# Patient Record
Sex: MALE | Race: WHITE | HISPANIC OR LATINO | Employment: FULL TIME | URBAN - METROPOLITAN AREA
[De-identification: names, ages, dates, MRNs, and addresses within clinical notes are randomized per-mention and may not be internally consistent; named-entity substitution may affect disease eponyms.]

---
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
## 2021-05-19 ENCOUNTER — APPOINTMENT (OUTPATIENT)
Dept: RADIOLOGY | Facility: MEDICAL CENTER | Age: 21
End: 2021-05-19
Attending: EMERGENCY MEDICINE
Payer: COMMERCIAL

## 2021-05-19 ENCOUNTER — HOSPITAL ENCOUNTER (EMERGENCY)
Facility: MEDICAL CENTER | Age: 21
End: 2021-05-19
Attending: EMERGENCY MEDICINE
Payer: COMMERCIAL

## 2021-05-19 VITALS
SYSTOLIC BLOOD PRESSURE: 112 MMHG | HEART RATE: 82 BPM | RESPIRATION RATE: 16 BRPM | HEIGHT: 68 IN | BODY MASS INDEX: 33.34 KG/M2 | OXYGEN SATURATION: 94 % | WEIGHT: 220 LBS | DIASTOLIC BLOOD PRESSURE: 55 MMHG | TEMPERATURE: 97.4 F

## 2021-05-19 DIAGNOSIS — S82.001A CLOSED DISPLACED FRACTURE OF RIGHT PATELLA, UNSPECIFIED FRACTURE MORPHOLOGY, INITIAL ENCOUNTER: ICD-10-CM

## 2021-05-19 DIAGNOSIS — S83.004A PATELLAR DISLOCATION, RIGHT, INITIAL ENCOUNTER: ICD-10-CM

## 2021-05-19 PROCEDURE — 99285 EMERGENCY DEPT VISIT HI MDM: CPT

## 2021-05-19 PROCEDURE — 700102 HCHG RX REV CODE 250 W/ 637 OVERRIDE(OP): Performed by: EMERGENCY MEDICINE

## 2021-05-19 PROCEDURE — 73562 X-RAY EXAM OF KNEE 3: CPT | Mod: RT

## 2021-05-19 PROCEDURE — A9270 NON-COVERED ITEM OR SERVICE: HCPCS | Performed by: EMERGENCY MEDICINE

## 2021-05-19 PROCEDURE — 27560 TREAT KNEECAP DISLOCATION: CPT

## 2021-05-19 PROCEDURE — 96374 THER/PROPH/DIAG INJ IV PUSH: CPT

## 2021-05-19 PROCEDURE — 700111 HCHG RX REV CODE 636 W/ 250 OVERRIDE (IP): Performed by: EMERGENCY MEDICINE

## 2021-05-19 RX ORDER — ACETAMINOPHEN 325 MG/1
650 TABLET ORAL ONCE
Status: COMPLETED | OUTPATIENT
Start: 2021-05-19 | End: 2021-05-19

## 2021-05-19 RX ORDER — OXYCODONE HYDROCHLORIDE 5 MG/1
5 TABLET ORAL EVERY 6 HOURS PRN
Qty: 10 TABLET | Refills: 0 | Status: SHIPPED | OUTPATIENT
Start: 2021-05-19 | End: 2021-05-22

## 2021-05-19 RX ORDER — IBUPROFEN 600 MG/1
600 TABLET ORAL ONCE
Status: COMPLETED | OUTPATIENT
Start: 2021-05-19 | End: 2021-05-19

## 2021-05-19 RX ORDER — HYDROCODONE BITARTRATE AND ACETAMINOPHEN 5; 325 MG/1; MG/1
1 TABLET ORAL ONCE
Status: COMPLETED | OUTPATIENT
Start: 2021-05-19 | End: 2021-05-19

## 2021-05-19 RX ADMIN — ACETAMINOPHEN 650 MG: 325 TABLET, FILM COATED ORAL at 20:08

## 2021-05-19 RX ADMIN — HYDROCODONE BITARTRATE AND ACETAMINOPHEN 1 TABLET: 5; 325 TABLET ORAL at 20:08

## 2021-05-19 RX ADMIN — IBUPROFEN 600 MG: 600 TABLET ORAL at 20:08

## 2021-05-19 RX ADMIN — FENTANYL CITRATE 50 MCG: 50 INJECTION, SOLUTION INTRAMUSCULAR; INTRAVENOUS at 18:27

## 2021-05-19 ASSESSMENT — FIBROSIS 4 INDEX: FIB4 SCORE: 0.36

## 2021-05-19 ASSESSMENT — PAIN DESCRIPTION - PAIN TYPE: TYPE: ACUTE PAIN

## 2021-05-19 NOTE — LETTER
"  FORM C-4:  EMPLOYEE’S CLAIM FOR COMPENSATION/ REPORT OF INITIAL TREATMENT  EMPLOYEE’S CLAIM - PROVIDE ALL INFORMATION REQUESTED   First Name HALEIGH Last Name Ronnie Birthdate 2000  Sex male Claim Number   Home [id number]   [place]             Zip [address]                                   Age  21 y.o. Height  1.727 m (5' 8\") Weight  99.8 kg (220 lb) N  [id number]   Mailing Address [id number]  [place]              Zip [address] Telephone  [phone] (home)  Primary Language Spoken   Insurer   Third Party   AIG Employee's Occupation (Job Title) When Injury or Occupational Disease Occurred  [employer]   Employer's Name  Telephone [phone]    Employer Address [address] [place] [facility] [place] [address]] Zip [address]   Date of Injury  5/19/2021       Hour of Injury  4:00 PM Date Employer Notified  5/19/2021 Last Day of Work after Injury or Occupational Disease  5/19/2021 Supervisor to Whom Injury Reported  [employer]   Address or Location of Accident (if applicable) Work [1]   What were you doing at the time of accident? (if applicable) Walking towards the stairs    How did this injury or occupational disease occur? Be specific and answer in detail. Use additional sheet if necessary)  Walking towards the stairs, turned and knee popped. Dropped to ground immediately. 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   If you believe that you have an occupational disease, when did you first have knowledge of the disability and it relationship to your employment? NA Witnesses to the Accident  None- NA   Nature of Injury or Occupational Disease  Workers' Compensation Part(s) of Body Injured or Affected  Knee (R), Lower Leg (R), N/A    I CERTIFY THAT THE ABOVE IS TRUE AND CORRECT TO THE BEST OF MY KNOWLEDGE AND THAT I HAVE PROVIDED THIS INFORMATION IN ORDER TO OBTAIN THE BENEFITS OF [place] AND " [place]  I HEREBY AUTHORIZE ANY PHYSICIAN, CHIROPRACTOR, SURGEON, PRACTITIONER, OR OTHER PERSON, ANY HOSPITAL, INCLUDING [facility] OR [facility], ANY MEDICAL SERVICE ORGANIZATION, ANY INSURANCE COMPANY, OR OTHER INSTITUTION OR ORGANIZATION TO RELEASE TO EACH OTHER, ANY MEDICAL OR OTHER INFORMATION, INCLUDING BENEFITS PAID OR PAYABLE, PERTINENT TO THIS INJURY OR DISEASE, EXCEPT INFORMATION RELATIVE TO DIAGNOSIS, TREATMENT AND/OR COUNSELING FOR AIDS, PSYCHOLOGICAL CONDITIONS, ALCOHOL OR CONTROLLED SUBSTANCES, FOR WHICH I MUST GIVE SPECIFIC AUTHORIZATION.  A PHOTOSTAT OF THIS AUTHORIZATION SHALL BE AS VALID AS THE ORIGINAL.  Date  05/19/2021                                    Place [facility]                                          Employee’s Signature   THIS REPORT MUST BE COMPLETED AND MAILED WITHIN 3 WORKING DAYS OF TREATMENT   Place [facility], EMERGENCY DEPT                       Name of Facility [facility]   Date  5/19/2021 Diagnosis  (S83.004A) Patellar dislocation, right, initial encounter  (S82.001A) Closed displaced fracture of right patella, unspecified fracture morphology, initial encounter Is there evidence the injured employee was under the influence of alcohol and/or another controlled substance at the time of accident?   Hour  8:35 PM Description of Injury or Disease  Patellar dislocation, right, initial encounter  Closed displaced fracture of right patella, unspecified fracture morphology, initial encounter No   Treatment  Dislocation reduction, knee immobilizer  Have you advised the patient to remain off work five days or more?         Yes   X-Ray Findings  Positive If Yes   From Date  5/19/2021 To Date  5/26/2021   From information given by the employee, together with medical evidence, can you directly connect this injury or occupational disease as job incurred? Yes If No,  "is employee capable of: Full Duty  No Modified Duty  No   Is additional medical care by a physician indicated? Yes  Comments:close ortho follow up and surgery If Modified Duty, Specify any Limitations / Restrictions       Do you know of any previous injury or disease contributing to this condition or occupational disease? Yes  Comments:prior dislocation, but no history of fracture    Date 5/19/2021 Print Doctor’s Name Zuly Morales I certify the employer’s copy of this form was mailed on:   Address [address] [place] [address] [id number] [phone] INSURER’S USE ONLY   Provider’s Tax ID Number   Telephone Dept: [phone]    Doctor’s Signature kelvin-ZULY Montero M.D. Degree  MD      Form C-4 (rev.10/07)                                                                         BRIEF DESCRIPTION OF RIGHTS AND BENEFITS  (Pursuant to [place] 616[place])    Notice of Injury or Occupational Disease (Incident Report Form C-1): If an injury or occupational disease (OD) arises out of and in the course of employment, you must provide written notice to your employer as soon as practicable, but no later than 7 days after the accident or OD. Your employer shall maintain a sufficient supply of the required forms.    Claim for Compensation (Form C-4): If medical treatment is sought, the form C-4 is available at the place of initial treatment. A completed \"Claim for Compensation\" (Form C-4) must be filed within 90 days after an accident or OD. The treating physician or chiropractor must, within 3 working days after treatment, complete and mail to the employer, the employer's insurer and third-party , the Claim for Compensation.    Medical Treatment: If you require medical treatment for your on-the-job injury or OD, you may be required to select a physician or chiropractor from a list provided by your workers’ compensation insurer, if it has contracted with an Organization for Managed Care (MCO) or Preferred Provider " Organization (PPO) or providers of health care. If your employer has not entered into a contract with an MCO or PPO, you may select a physician or chiropractor from the Panel of Physicians and Chiropractors. Any medical costs related to your industrial injury or OD will be paid by your insurer.    Temporary Total Disability (TTD): If your doctor has certified that you are unable to work for a period of at least 5 consecutive days, or 5 cumulative days in a 20-day period, or places restrictions on you that your employer does not accommodate, you may be entitled to TTD compensation.    Temporary Partial Disability (TPD): If the wage you receive upon reemployment is less than the compensation for TTD to which you are entitled, the insurer may be required to pay you TPD compensation to make up the difference. TPD can only be paid for a maximum of 24 months.    Permanent Partial Disability (PPD): When your medical condition is stable and there is an indication of a PPD as a result of your injury or OD, within 30 days, your insurer must arrange for an evaluation by a rating physician or chiropractor to determine the degree of your PPD. The amount of your PPD award depends on the date of injury, the results of the PPD evaluation, your age and wage.    Permanent Total Disability (PTD): If you are medically certified by a treating physician or chiropractor as permanently and totally disabled and have been granted a PTD status by your insurer, you are entitled to receive monthly benefits not to exceed 66 2/3% of your average monthly wage. The amount of your PTD payments is subject to reduction if you previously received a lump-sum PPD award.    Vocational Rehabilitation Services: You may be eligible for vocational rehabilitation services if you are unable to return to the job due to a permanent physical impairment or permanent restrictions as a result of your injury or occupational disease.    Transportation and Per [place]  Reimbursement: You may be eligible for travel expenses and per ugo associated with medical treatment.    Reopening: You may be able to reopen your claim if your condition worsens after claim closure.     Appeal Process: If you disagree with a written determination issued by the insurer or the insurer does not respond to your request, you may appeal to the Department of Administration, , by following the instructions contained in your determination letter. You must appeal the determination within 70 days from the date of the determination letter at [address], [address], [place], [place] [address], or [address], [place], [place] [address]. If you disagree with the  decision, you may appeal to the Department of Administration, . You must file your appeal within 30 days from the date of the  decision letter at [address], [address], [place], [place] [address], or [address], [place], [place] [address]. If you disagree with a decision of an , you may file a petition for judicial review with the District Court. You must do so within 30 days of the Appeal Officer’s decision. You may be represented by an  at your own expense or you may contact the [facility] for possible representation.    [place]  for Injured Workers (NAIW): If you disagree with a  decision, you may request that NAIW represent you without charge at an  Hearing. For information regarding denial of benefits, you may contact the [facility] at: [address], [address], [place] [address], [phone], or [address], [facility] [address] [address], [phone]    To File a Complaint with the Division: If you wish to file a complaint with the  of the Division of Industrial Relations (DIR),  please contact the Workers’ Compensation Section, [address] [place] [address]  [address], [address], [place], [place] [address], telephone [phone], or [address], [address], [place], [place] [address], telephone [phone].    For assistance with Workers’ Compensation Issues: You may contact the [facility] Office for Consumer Health Assistance, [address], [address], [place], [place] [address], Toll Free 1-261.972.4838, Web site: http://[facility].[place].gov/Programs/ALPHONSO E-mail: alphonso@[facility].nv.gov  D-2 (rev. 10/20)              __________________________________________________________________                                    ____05/19/2021_____________            Employee Name / Signature                                                                                                                            Date

## 2021-05-20 NOTE — ED PROVIDER NOTES
"ED Provider Note    Scribed for Saulo Morales M.D. by Suellen Melendez. 5/19/2021,  6:00 PM.    Means of Arrival: EMS  History obtained from: Patient  History limited by: None    CHIEF COMPLAINT  Chief Complaint   Patient presents with    Knee Injury     pt was at work when dislocated his R knee. pt presents with R knee dislocation, R knee is swollen, CMS intact. pt given 75 of fentanyl en route     HPI  Pradeep Trujillo is a 21 y.o. male who presents to the Emergency Department via EMS for evaluation of a right knee injury. The patient was at work when he dislocated his right knee. The patient has dislocated this knee in the past. The patient received Fentanyl from EMS en route.     REVIEW OF SYSTEMS  MUSCULOSKELETAL: Right knee injury.   See HPI for further details.     PAST MEDICAL HISTORY  Past Medical History:   Diagnosis Date    History of recurrent UTIs     Red man syndrome     VSD (ventricular septal defect)      FAMILY HISTORY  None pertinent    SOCIAL HISTORY   reports that he has never smoked. He has never used smokeless tobacco. He reports previous alcohol use. He reports previous drug use.    SURGICAL HISTORY  Past Surgical History:   Procedure Laterality Date    NEPHRECTOMY RADICAL      OTHER ABDOMINAL SURGERY      Unknown bladder surgery at age 6, x2    OTHER CARDIAC SURGERY       CURRENT MEDICATIONS  Home Medications       Reviewed by Joyce Moon R.N. (Registered Nurse) on 05/19/21 at 1755  Med List Status: Complete     Medication Last Dose Status        Patient Anjum Taking any Medications                         ALLERGIES  Allergies   Allergen Reactions    Penicillins     Vancomycin      PHYSICAL EXAM  VITAL SIGNS: /96   Pulse 85   Temp 36.3 °C (97.4 °F) (Temporal)   Resp 16   Ht 1.727 m (5' 8\")   Wt 99.8 kg (220 lb)   SpO2 96%   BMI 33.45 kg/m²    Gen: Alert, no acute distress.  HEENT: ATNC.  Eyes: PERRL, EOMI, normal conjunctiva.   Neck: trachea midline.  Resp: no respiratory " distress.  CV: No JVD.  Abd: non-distended.  Ext: Deformity with lateral dislocation of right patella.  Otherwise, no deformities, 2+ dorsalis pedis pulse, distal CSM intact with slightly delayed capillary refill.   Psych: normal mood.  Neuro: speech fluent.    DIAGNOSTIC STUDIES / PROCEDURES     RADIOLOGY  DX-KNEE 3 VIEWS RIGHT   Final Result         Likely lateral patella dislocation with fracture of the medial aspect of the patella.      Moderate knee joint effusion with lipohemarthrosis.        The radiologist’s interpretation of all radiology studies have been reviewed by me.    Joint Reduction Procedure Note    Indication: Joint dislocation    Consent: The patient was counseled regarding the procedure, it's indications, risks, potential complications and alternatives and any questions were answered. Consent was obtained.    Procedure: The pre-reduction exam showed distal perfusion & neurologic function to be normal. The patient was placed in the appropriate position. Anesthesia/pain control was obtained using Fentanyl. Reduction of the right knee was performed by knee extension and medial traction. Post reduction films were obtained and revealed satisfactory reduction. A post-reduction exam revealed distal perfusion & neurologic function to be normal. The affected area was immobilized with a knee immobilizer and crutches were provided for ambulatory support.    The patient tolerated the procedure well.    Complications: None    COURSE & MEDICAL DECISION MAKING  Pertinent Labs & Imaging studies reviewed. (See chart for details)    6:00 PM Patient seen and examined at bedside. Patient will be treated with Fentanyl 50 mcg for his symptoms.      6:27 PM - Patient reevaluated. X-ray was currently in the patient's room.     6:27 PM - I performed a joint reduction procedure at this time.    7:01 AM - Paged Ortho.      7:06 PM - I informed the patient that when his right patella was dislocated, it was also fractured.  I informed him that I will consult Ortho regarding the next steps.     7:31 PM - I discussed the patient's case and the above findings with Dr. Yeung (Ortho) who recommends surgical repair and will see the patient tomorrow in clinic for follow up.     7:39 PM - Updated the patient on my discussion with Dr. Yeung and the need for surgical repair. Instructed the patient to follow up at the [facility] tomorrow. Patient was given an opportunity to ask questions at this time. He is requesting more pain medication before he is discharged. I discussed plan of discharge and strict return precautions for any new or worsening symptoms. The patient verbalizes agreement and understands.    7:42 PM - The patient will be treated with Tylenol 650 mg, Motrin 600 mg, and Norco 325 mg tablet for his symptoms.    Medical Decision Making:  Patient presents with patellar dislocation.  No evidence of knee dislocation.  Distal CSM intact.  No evidence of other trauma on evaluation of the patient.  This was reduced at the bedside, and x-rays demonstrate an associated patellar fracture.  This was discussed with orthopedics, who recommend follow-up tomorrow in the orthopedic clinic for surgical planning.    I reviewed prescription monitoring program for patient's narcotic use before prescribing a scheduled drug.The patient will not drink alcohol nor drive with prescribed medications. The patient will return for new or worsening symptoms and is stable at the time of discharge.    The patient is referred to a primary physician for blood pressure management, diabetic screening, and for all other preventative health concerns.    In prescribing controlled substances to this patient, I certify that I have obtained and reviewed the medical history of HALEIGH POORNIMA Trujillo. I have also made a good ruddy effort to obtain applicable records from other providers who have treated the patient and no other records are available at this  time.     I have conducted a physical exam and documented it. I have reviewed Mr. Trujillo’s prescription history as maintained by the [place] Prescription Monitoring Program.     I have assessed the patient’s risk for abuse, dependency, and addiction using the validated Opioid Risk Tool available at https://www.mdcalc.com/zuemcd-nmtv-hgad-ort-narcotic-abuse.     Given the above, I believe the benefits of controlled substance therapy outweigh the risks. The reasons for prescribing controlled substances include non-narcotic, oral analgesic alternatives have been inadequate for pain control. Accordingly, I have discussed the risk and benefits, treatment plan, and alternative therapies with the patient.       DISPOSITION:  Patient will be discharged home in stable condition.    FOLLOW UP:  Yaakov Yeung M.D.  [address] [facility] [address]  [phone]      Please go to [facility] tomorrow for follow up.    [facility], Emergency Dept  [address] [facility] [id number] [phone]    If symptoms worsen    OUTPATIENT MEDICATIONS:  Discharge Medication List as of 5/19/2021  8:52 PM        START taking these medications    Details   oxyCODONE immediate-release (ROXICODONE) 5 MG Tab Take 1 tablet by mouth every 6 hours as needed for Severe Pain for up to 3 days., Disp-10 tablet, R-0, Normal           FINAL IMPRESSION  1. Patellar dislocation, right, initial encounter    2. Closed displaced fracture of right patella, unspecified fracture morphology, initial encounter       Joint reduction procedure was performed by ERP, as outlined above.      Suellen BRODY (Angelina), am scribing for, and in the presence of, Saulo Morales M.D..    Electronically signed by: Suellen Melendez (Angelina), 5/19/2021    Saulo BRODY M.D. personally performed the services described in this documentation, as scribed by Suellen Melendez in my presence, and it is both  accurate and complete.    The note accurately reflects work and decisions made by me.  Saulo Morales M.D.  5/19/2021  9:28 PM    E.     This dictation was created using voice recognition software. The accuracy of the dictation is limited to the abilities of the software. I expect there may be some errors of grammar and possibly content. The nursing notes were reviewed and certain aspects of this information were incorporated into this note.

## 2021-05-20 NOTE — ED TRIAGE NOTES
"Chief Complaint   Patient presents with   • Knee Injury     pt was at work when dislocated his R knee. pt presents with R knee dislocation, R knee is swollen, CMS intact. pt given 75 of fentanyl en route     Pt BIB EMS from work. Pt aox4, GCS 15. Pt states 5/10 pain in R knee. Pt tried to put knee back into place but it did not work. Pt states this has happened before but not as bad      /96   Pulse 85   Temp 36.3 °C (97.4 °F) (Temporal)   Resp 16   Ht 1.727 m (5' 8\")   Wt 99.8 kg (220 lb)   SpO2 96%   BMI 33.45 kg/m²     "

## 2021-05-20 NOTE — DISCHARGE INSTRUCTIONS
You were seen in the emergency department for a kneecap dislocation.  This was reduced in the emergency department.  Your x-rays do show a fracture of your kneecap.  This will require surgical repair.    Please go to the renal orthopedic clinic tomorrow and they will schedule you for surgery.    For pain you can take ibuprofen (Motrin), 600mg every 6 hours as needed for pain (take with food to avoid GI upset). You can also take acetaminophen (Tylenol), 1000mg every 8 hours as needed for pain. Do not take more than 3000mg of acetaminophen in any 24 hour period.  Taking these medications regularly during the day can be very effective in controlling pain.    You are being sent home with an opioid pain medication. This medication causes sedation and you should not drive or operate machinery while taking it. You should not use alcohol or recreational drugs while taking this medication. Side effects of this medication can include itching, nausea, and constipation.    There is a risk of addiction to this medication and you should only take the smallest amount necessary to control your symptoms. You should use other non-opioid pain medications for your baseline pain and only use this medication if necessary.     There are many alternatives to pain medications, such as massage, acupuncture, and meditation. Check with your health insurance regarding their coverage of these complementary treatments.    We are not able to refill opioid or other controlled substance prescriptions in the emergency department. If you are having ongoing pain that requires opioids, please see your primary care provider or specialist for further prescriptions.     Please return to the emergency department or seek medical attention if you develop:  Uncontrollable pain, cold dusky toes, chest pains, coughing up blood, any other new or concerning findings

## 2021-05-20 NOTE — ED NOTES
DC instructions reviewed with pt. Pt verbalized understanding. ER tech at bedside to educate pt about use of crutches.